# Patient Record
Sex: MALE | Race: BLACK OR AFRICAN AMERICAN | Employment: FULL TIME
[De-identification: names, ages, dates, MRNs, and addresses within clinical notes are randomized per-mention and may not be internally consistent; named-entity substitution may affect disease eponyms.]

---

## 2022-06-07 NOTE — PROGRESS NOTES
Brandon Avelar M.D. Family Medicine and Sports Medicine  [address]  Office : [phone]  Fax : [phone]    Chief Complaint   Patient presents with   Zaraernesto Remberto Establish Care     bluured vision / tingling in the feet x 8 months        History of Present Illness:  Analy Bocanegra is a 45 y.o. male. HPI  49-year-old male presents to establish care with a new provider. Patient endorses some blurred vision as well as tingling in his feet for the past 8 months. Gradual worsening blurry vision over the past several months. Tingling in BL feet over 9 months. Gradual worsening. Started in the right, now in both. No back pain. Denies weakness in legs. Denies incontinence. Worse after a big meal and after first getting out of bed. Paresthesias (numbness/tingling) felt in BL feet. Also endorses polyuria  Denies any known PMH otherwise. Never seen a PCP. Never checked BP or glucose. Denies any CP, SOB, Palpitations. States that he otherwise feels well. Past Medical History:  No past medical history on file. Past Surgical History:  No past surgical history on file. Allergies:   No Known Allergies  Medications:   Prior to Admission medications    Medication Sig Start Date End Date Taking? Authorizing Provider   hydroCHLOROthiazide (HYDRODIURIL) 25 MG tablet Take 1 tablet by mouth every morning 6/8/22  Yes Caio Trimble MD   gabapentin (NEURONTIN) 300 MG capsule Take 1 capsule by mouth 3 times daily as needed (numbness/tingling) for up to 30 days. 6/8/22 7/8/22 Yes Caio Trimble MD      Social History:  Social History     Tobacco Use    Smoking status: Never Smoker    Smokeless tobacco: Never Used   Substance Use Topics    Alcohol use: Not Currently     Family History  Family History   Problem Relation Age of Onset    Cancer Mother     Diabetes Mother        ROS:  All other ROS were negative.  See [facility] for pertinent ROS      Vital Signs  BP (!) 171/107 Comment: Given Hydralazine 10 mg @ 2:15pm  Pulse (!) 108   Temp 98.5 °F (36.9 °C) (Temporal)   Wt 235 lb 3.2 oz (106.7 kg)   SpO2 98%   There is no height or weight on file to calculate BMI. Physical Exam  Vitals and nursing note reviewed. Constitutional:       General: He is not in acute distress. Appearance: He is normal weight. He is not ill-appearing. HENT:      Head: Normocephalic and atraumatic. Eyes:      Extraocular Movements: Extraocular movements intact. Conjunctiva/sclera: Conjunctivae normal.   Cardiovascular:      Rate and Rhythm: Regular rhythm. Tachycardia present. Pulses: Normal pulses. Heart sounds: Normal heart sounds. Pulmonary:      Effort: Pulmonary effort is normal.      Breath sounds: Normal breath sounds. Musculoskeletal:         General: No deformity. Skin:     General: Skin is warm. Neurological:      General: No focal deficit present. Mental Status: He is alert and oriented to person, place, and time. Mental status is at baseline. Psychiatric:         Mood and Affect: Mood normal.         Behavior: Behavior normal.         PMH, PSH, SHx, FHx, Allergies, Medications reviewed and updated as indicated    Assessment/Plan:  Nelida Fagan was seen today for establish care. Diagnoses and all orders for this visit:    Establishing care with new doctor, encounter for  Patient presents to establish care with a new doctor. The  PMH, Fhx, Shx, Allergies, Medications were reviewed from any prior documentation available as well as discussed with the patient. This history has been documented into the chart. Recommended lifestyle changes including proper diet and exercise were discussed. Recommended screenings and baseline labs were reviewed with patient based off age and gender. All questions were answered at this time.      Preventative health care  -     Hepatitis C RNA QNT W Genotype RFLX  -     CBC with Auto Differential  -     Comprehensive Metabolic Panel  -     PSA, Total and Free  -     TSH with Reflex  -     Vitamin D 25 Hydroxy  -     Lipid Panel  -     HIV 1/2 Ag/Ab, 4TH Generation,W Rflx Confirm  -     Hemoglobin A1C    Essential hypertension  Significantly elevated blood pressure in the office. Likely chronic. Denies any red flags. Hydralazine 10 mg provided in the office. Improved blood pressure slightly on recheck. Precautions given. Indications to go to the emergency department discussed. Start on hydrochlorothiazide 25 mg. Follow-up in 1 week. Precautions given. -     hydroCHLOROthiazide (HYDRODIURIL) 25 MG tablet; Take 1 tablet by mouth every morning    Paresthesias  8 to 9 months of bilateral paresthesias. I suspect this is related to undiagnosed diabetes. Will check baseline labs. Consider nerve conduction study. Gabapentin 300 mg prescribed. Precautions given. -     Vitamin B12  -     Folate  -     Ferritin  -     Iron  -     Transferrin  -     Transferrin Saturation  -     gabapentin (NEURONTIN) 300 MG capsule; Take 1 capsule by mouth 3 times daily as needed (numbness/tingling) for up to 30 days. Blurred vision, bilateral  Patient reports several months of blurred vision bilaterally. I suspect this is likely related to his hypertension and possibly some uncontrolled diabetes. Checking labs listed above. Blood pressure medication prescribed. Close follow-up. Referral to ophthalmology  -     Amb External Referral To Optometry    Colon cancer screening  -     Ambulatory referral to Colonoscopy          45 minutes was spent on the day of this encounter including face to face time with patient, chart review, reviewing pt hx, ordering meds/tests/procedures, documenting, and/or interpreting results.     __  Crow Aceves M.D.

## 2022-06-08 ENCOUNTER — OFFICE VISIT (OUTPATIENT)
Dept: INTERNAL MEDICINE CLINIC | Facility: CLINIC | Age: 52
End: 2022-06-08
Payer: COMMERCIAL

## 2022-06-08 VITALS
HEART RATE: 108 BPM | SYSTOLIC BLOOD PRESSURE: 171 MMHG | DIASTOLIC BLOOD PRESSURE: 107 MMHG | OXYGEN SATURATION: 98 % | TEMPERATURE: 98.5 F | WEIGHT: 235.2 LBS

## 2022-06-08 DIAGNOSIS — H53.8 BLURRED VISION, BILATERAL: ICD-10-CM

## 2022-06-08 DIAGNOSIS — I10 ESSENTIAL HYPERTENSION: ICD-10-CM

## 2022-06-08 DIAGNOSIS — R20.2 PARESTHESIAS: ICD-10-CM

## 2022-06-08 DIAGNOSIS — Z00.00 PREVENTATIVE HEALTH CARE: ICD-10-CM

## 2022-06-08 DIAGNOSIS — Z76.89 ESTABLISHING CARE WITH NEW DOCTOR, ENCOUNTER FOR: Primary | ICD-10-CM

## 2022-06-08 DIAGNOSIS — Z12.11 COLON CANCER SCREENING: ICD-10-CM

## 2022-06-08 PROCEDURE — 99204 OFFICE O/P NEW MOD 45 MIN: CPT | Performed by: FAMILY MEDICINE

## 2022-06-08 RX ORDER — HYDROCHLOROTHIAZIDE 25 MG/1
25 TABLET ORAL EVERY MORNING
Qty: 30 TABLET | Refills: 0 | Status: SHIPPED | OUTPATIENT
Start: 2022-06-08 | End: 2022-06-16 | Stop reason: SDUPTHER

## 2022-06-08 RX ORDER — GABAPENTIN 300 MG/1
300 CAPSULE ORAL 3 TIMES DAILY PRN
Qty: 90 CAPSULE | Refills: 0 | Status: SHIPPED | OUTPATIENT
Start: 2022-06-08 | End: 2022-06-16 | Stop reason: SDUPTHER

## 2022-06-08 ASSESSMENT — PATIENT HEALTH QUESTIONNAIRE - PHQ9
SUM OF ALL RESPONSES TO PHQ QUESTIONS 1-9: 0
2. FEELING DOWN, DEPRESSED OR HOPELESS: 0
SUM OF ALL RESPONSES TO PHQ QUESTIONS 1-9: 0
SUM OF ALL RESPONSES TO PHQ9 QUESTIONS 1 & 2: 0
SUM OF ALL RESPONSES TO PHQ QUESTIONS 1-9: 0
1. LITTLE INTEREST OR PLEASURE IN DOING THINGS: 0
SUM OF ALL RESPONSES TO PHQ QUESTIONS 1-9: 0

## 2022-06-08 ASSESSMENT — ANXIETY QUESTIONNAIRES
7. FEELING AFRAID AS IF SOMETHING AWFUL MIGHT HAPPEN: 0
4. TROUBLE RELAXING: 0
GAD7 TOTAL SCORE: 0
2. NOT BEING ABLE TO STOP OR CONTROL WORRYING: 0
5. BEING SO RESTLESS THAT IT IS HARD TO SIT STILL: 0
1. FEELING NERVOUS, ANXIOUS, OR ON EDGE: 0
6. BECOMING EASILY ANNOYED OR IRRITABLE: 0
3. WORRYING TOO MUCH ABOUT DIFFERENT THINGS: 0

## 2022-06-08 NOTE — PATIENT INSTRUCTIONS
Patient Education        Learning About Carbohydrate (Carb) Counting and Eating Out When You Have Diabetes  Why plan your meals? Meal planning can be a key part of managing diabetes. Planning meals and snacks with the right balance of carbohydrate, protein, and fat can help you keep yourblood sugar at the target level you set with your doctor. You don't have to eat special foods. You can eat what your family eats, including sweets once in a while. But you do have to pay attention to how oftenyou eat and how much you eat of certain foods. You may want to work with a dietitian or a diabetes educator. They can give you tips and meal ideas and can answer your questions about meal planning. This health professional can also help you reach a healthy weight if that is one ofyour goals. What should you know about eating carbs? Managing the amount of carbohydrate (carbs) you eat is an important part ofhealthy meals when you have diabetes. Carbohydrate is found in many foods.  Learn which foods have carbs. And learn the amounts of carbs in different foods. ? Bread, cereal, pasta, and rice have about 15 grams of carbs in a serving. A serving is 1 slice of bread (1 ounce), ½ cup of cooked cereal, or 1/3 cup of cooked pasta or rice. ? Fruits have 15 grams of carbs in a serving. A serving is 1 small fresh fruit, such as an apple or orange; ½ of a banana; ½ cup of cooked or canned fruit; ½ cup of fruit juice; 1 cup of melon or raspberries; or 2 tablespoons of dried fruit. ? Milk and no-sugar-added yogurt have 15 grams of carbs in a serving. A serving is 1 cup of milk or 3/4 cup (6 oz) of no-sugar-added yogurt. ? Starchy vegetables have 15 grams of carbs in a serving. A serving is ½ cup of mashed potatoes or sweet potato; 1 cup winter squash; ½ of a small baked potato; ½ cup of cooked beans; or ½ cup cooked corn or green peas.    Learn how much carbs to eat each day and at each meal. A dietitian or certified diabetes educator can teach you how to keep track of the amount of carbs you eat. This is called carbohydrate counting.  If you are not sure how to count carbohydrate grams, use the plate method to plan meals. It is a quick way to make sure that you have a balanced meal. It also can help you manage the amount of carbohydrate you eat at meals. ? Divide your plate by types of foods. Put non-starchy vegetables on half the plate, meat or other protein food on one-quarter of the plate, and a grain or starchy vegetable in the final quarter of the plate. To this you can add a small piece of fruit and 1 cup of milk or yogurt, depending on how many carbs you are supposed to eat at a meal.   Try to eat about the same amount of carbs at each meal. Do not \"save up\" your daily allowance of carbs to eat at one meal.   Proteins have very little or no carbs. Examples of proteins are beef, chicken, turkey, fish, eggs, tofu, cheese, cottage cheese, and peanut butter. How can you eat out and still eat healthy?  Learn to estimate the serving sizes of foods that have carbohydrate. If you measure food at home, it will be easier to estimate the amount in a serving of restaurant food.  If the meal you order has too much carbohydrate (such as potatoes, corn, or baked beans), ask to have a low-carbohydrate food instead. Ask for a salad or non-starchy vegetables like broccoli, cauliflower, green beans, or peppers.  If you eat more carbohydrate at a meal than you had planned, take a walk or do other exercise. This will help lower your blood sugar. What are some tips for eating healthy?  Limit saturated fat, such as the fat from meat and dairy products. This is a healthy choice because people who have diabetes are at higher risk of heart disease. So choose lean cuts of meat and nonfat or low-fat dairy products. Use olive or canola oil instead of butter or shortening when cooking.  Don't skip meals.  Your blood sugar may drop too low if you skip meals and take insulin or certain medicines for diabetes.  Check with your doctor before you drink alcohol. Alcohol can cause your blood sugar to drop too low. Alcohol can also cause a bad reaction if you take certain diabetes medicines. Follow-up care is a key part of your treatment and safety. Be sure to make and go to all appointments, and call your doctor if you are having problems. It's also a good idea to know your test results and keep alist of the medicines you take. Where can you learn more? Go to https://[employer]. org and sign in to your [employer] account. Enter Y478 in the [employer] box to learn more about \"Learning About Carbohydrate (Carb) Counting and Eating Out When You Have Diabetes. \"     If you do not have an account, please click on the \"Sign Up Now\" link. Current as of: September 8, 2021               Content Version: 13.2  © 0515-0870 Healthwise, Incorporated. Care instructions adapted under license by [facility] [address] If you have questions about a medical condition or this instruction, always ask your healthcare professional. [place] [address] any warranty or liability for your use of this information.

## 2022-06-10 ENCOUNTER — TELEPHONE (OUTPATIENT)
Dept: INTERNAL MEDICINE CLINIC | Facility: CLINIC | Age: 52
End: 2022-06-10

## 2022-06-10 DIAGNOSIS — E55.9 VITAMIN D DEFICIENCY: Primary | ICD-10-CM

## 2022-06-10 DIAGNOSIS — E11.65 UNCONTROLLED TYPE 2 DIABETES MELLITUS WITH HYPERGLYCEMIA (HCC): ICD-10-CM

## 2022-06-10 LAB
25(OH)D3 SERPL-MCNC: 14.4 NG/ML (ref 30–100)
ALBUMIN SERPL-MCNC: 3.5 G/DL (ref 3.5–5)
ALBUMIN/GLOB SERPL: 0.9 {RATIO} (ref 1.2–3.5)
ALP SERPL-CCNC: 109 U/L (ref 50–136)
ALT SERPL-CCNC: 17 U/L (ref 12–65)
ANION GAP SERPL CALC-SCNC: 7 MMOL/L (ref 7–16)
AST SERPL-CCNC: 12 U/L (ref 15–37)
BASOPHILS # BLD: 0 K/UL (ref 0–0.2)
BASOPHILS NFR BLD: 1 % (ref 0–2)
BILIRUB SERPL-MCNC: 0.9 MG/DL (ref 0.2–1.1)
BUN SERPL-MCNC: 17 MG/DL (ref 6–23)
CALCIUM SERPL-MCNC: 9 MG/DL (ref 8.3–10.4)
CHLORIDE SERPL-SCNC: 106 MMOL/L (ref 98–107)
CHOLEST SERPL-MCNC: 162 MG/DL
CO2 SERPL-SCNC: 23 MMOL/L (ref 21–32)
CREAT SERPL-MCNC: 1.1 MG/DL (ref 0.8–1.5)
DIFFERENTIAL METHOD BLD: ABNORMAL
EOSINOPHIL # BLD: 0 K/UL (ref 0–0.8)
EOSINOPHIL NFR BLD: 0 % (ref 0.5–7.8)
ERYTHROCYTE [DISTWIDTH] IN BLOOD BY AUTOMATED COUNT: 12.6 % (ref 11.9–14.6)
EST. AVERAGE GLUCOSE BLD GHB EST-MCNC: 295 MG/DL
FERRITIN SERPL-MCNC: 66 NG/ML (ref 8–388)
FOLATE SERPL-MCNC: 15 NG/ML (ref 3.1–17.5)
GLOBULIN SER CALC-MCNC: 4 G/DL (ref 2.3–3.5)
GLUCOSE SERPL-MCNC: 256 MG/DL (ref 65–100)
HBA1C MFR BLD: 11.9 % (ref 4.2–6.3)
HCT VFR BLD AUTO: 40.7 % (ref 41.1–50.3)
HDLC SERPL-MCNC: 54 MG/DL (ref 40–60)
HDLC SERPL: 3 {RATIO}
HGB BLD-MCNC: 13.3 G/DL (ref 13.6–17.2)
HIV 1+2 AB+HIV1 P24 AG SERPL QL IA: NONREACTIVE
HIV 1/2 RESULT COMMENT: NORMAL
IMM GRANULOCYTES # BLD AUTO: 0 K/UL (ref 0–0.5)
IMM GRANULOCYTES NFR BLD AUTO: 0 % (ref 0–5)
IRON SERPL-MCNC: 38 UG/DL (ref 35–150)
LDLC SERPL CALC-MCNC: 92.4 MG/DL
LYMPHOCYTES # BLD: 2.3 K/UL (ref 0.5–4.6)
LYMPHOCYTES NFR BLD: 40 % (ref 13–44)
MCH RBC QN AUTO: 29.4 PG (ref 26.1–32.9)
MCHC RBC AUTO-ENTMCNC: 32.7 G/DL (ref 31.4–35)
MCV RBC AUTO: 90 FL (ref 79.6–97.8)
MONOCYTES # BLD: 0.5 K/UL (ref 0.1–1.3)
MONOCYTES NFR BLD: 9 % (ref 4–12)
NEUTS SEG # BLD: 3 K/UL (ref 1.7–8.2)
NEUTS SEG NFR BLD: 50 % (ref 43–78)
NRBC # BLD: 0 K/UL (ref 0–0.2)
PLATELET # BLD AUTO: 352 K/UL (ref 150–450)
PMV BLD AUTO: 11.3 FL (ref 9.4–12.3)
POTASSIUM SERPL-SCNC: 4.1 MMOL/L (ref 3.5–5.1)
PROT SERPL-MCNC: 7.5 G/DL (ref 6.3–8.2)
PSA FREE MFR SERPL: 11.5 %
PSA FREE SERPL-MCNC: 0.3 NG/ML
PSA SERPL-MCNC: 2.6 NG/ML
RBC # BLD AUTO: 4.52 M/UL (ref 4.23–5.6)
SODIUM SERPL-SCNC: 136 MMOL/L (ref 136–145)
TRANSFERRIN SERPL-MCNC: 240 MG/DL (ref 202–364)
TRIGL SERPL-MCNC: 78 MG/DL (ref 35–150)
TSH W FREE THYROID IF ABNORMAL: 2.9 UIU/ML (ref 0.36–3.74)
VIT B12 SERPL-MCNC: 746 PG/ML (ref 193–986)
VLDLC SERPL CALC-MCNC: 15.6 MG/DL (ref 6–23)
WBC # BLD AUTO: 5.9 K/UL (ref 4.3–11.1)

## 2022-06-10 RX ORDER — ERGOCALCIFEROL 1.25 MG/1
50000 CAPSULE ORAL WEEKLY
Qty: 12 CAPSULE | Refills: 0 | Status: SHIPPED | OUTPATIENT
Start: 2022-06-10 | End: 2022-09-30 | Stop reason: SDUPTHER

## 2022-06-10 NOTE — TELEPHONE ENCOUNTER
Spoke to patient on the phone regarding lab results. We will send in vitamin D supplementation for low vitamin D. We will need to recheck CBC in the near future regarding his slightly low hemoglobin. He has already been referred for colonoscopy. He has significantly elevated A1c at 11.9 likely contributing to his symptoms of blurred vision and paresthesias. We will send for diabetic education. Counseled on diet and carbohydrate control. Will start on metformin 500 twice daily, with quick increase to thousand twice daily once tolerating. Will likely need multiple other medications.   We will follow-up in a week for recheck

## 2022-06-11 LAB
HCV GENTYP SERPL NAA+PROBE: NORMAL
HCV RNA SERPL NAA+PROBE-ACNC: NORMAL IU/ML
HCV RNA SERPL NAA+PROBE-LOG IU: NORMAL LOG10 IU/ML
LABORATORY COMMENT REPORT: NORMAL

## 2022-06-14 PROBLEM — I10 ESSENTIAL HYPERTENSION: Status: ACTIVE | Noted: 2022-06-14

## 2022-06-14 NOTE — PROGRESS NOTES
Vito Stephenson M.D. Family and Sports Medicine  [address] , [address] [place] [address]  Office : [phone]  Fax : [phone]    Chief Complaint   Patient presents with    Follow-up     blood pressure check        History of Present Illness:  Onel Celeste is a 45 y.o. male. HPI  12-year-old male presents for follow-up of his uncontrolled type 2 diabetes with associated paresthesias and blurred vision, hypertension, lab review    Lab Review:  Spoke to patient on the phone regarding lab results. We will send in vitamin D supplementation for low vitamin D. We will need to recheck CBC in the near future regarding his slightly low hemoglobin. He has already been referred for colonoscopy. He has significantly elevated A1c at 11.9 likely contributing to his symptoms of blurred vision and paresthesias. We will send for diabetic education. Counseled on diet and carbohydrate control. Will start on metformin 500 twice daily, with quick increase to thousand twice daily once tolerating. Will likely need multiple other medications. We will follow-up in a week for recheck    Essential hypertension  Significantly elevated blood pressure in the office. Likely chronic. Denies any red flags. Hydralazine 10 mg provided in the office. Improved blood pressure slightly on recheck. Precautions given. Indications to go to the emergency department discussed. Start on hydrochlorothiazide 25 mg. Has been checking BP at home. 1-2 checks at home were around 134/100. Otherwise tolerating medication well    Uncontrolled type 2 diabetes:  Patient presented [facility] on [facility]2022 with complaints of 9 months of paresthesias as well as decreased vision. Lab review revealed A1c of 11.9. He was started on metformin 500 twice daily with instructions to increase up to thousand twice daily. He was given gabapentin 300 mg for paresthesias.   He was referred to diabetic education. Has been tolerating metformin 500 twice daily. Only endorses 1 episode of some lightheadedness when he did not eat breakfast.  Otherwise doing well. Denies GI side effects. Has had some improvement in his paresthesia with the gabapentin. Still present however. Past Medical History:  No past medical history on file. Medications:   Prior to Admission medications    Medication Sig Start Date End Date Taking? Authorizing Provider   amLODIPine (NORVASC) 5 MG tablet Take 1 tablet by mouth daily 6/16/22  Yes Radha Benjamin MD   metFORMIN (GLUCOPHAGE) 1000 MG tablet Take 1 tablet by mouth 2 times daily (with meals) 6/16/22  Yes Radha Benjamin MD   glipiZIDE (GLUCOTROL) 5 MG tablet Take 1 tablet by mouth 2 times daily (before meals) 6/16/22  Yes Radha Benjamin MD   hydroCHLOROthiazide (HYDRODIURIL) 25 MG tablet Take 1 tablet by mouth every morning 6/16/22  Yes Radha Benjamin MD   gabapentin (NEURONTIN) 300 MG capsule Take 1 capsule by mouth 3 times daily as needed (numbness/tingling) for up to 30 days. 6/16/22 7/16/22 Yes Radha Benjamin MD   vitamin D (ERGOCALCIFEROL) 1.25 MG (03644 UT) CAPS capsule Take 1 capsule by mouth once a week 6/10/22  Yes Radha Benjamin MD        ROS:  All other pertinent ROS are negative. See HPI for pertinent ROS    Vital Signs  BP (!) 138/95   Pulse (!) 102   Temp 98.3 °F (36.8 °C) (Temporal)   Wt 234 lb (106.1 kg)   SpO2 98%   There is no height or weight on file to calculate BMI. Physical Exam  Vitals and nursing note reviewed. Constitutional:       General: He is not in acute distress. Appearance: He is normal weight. He is not ill-appearing. HENT:      Head: Normocephalic and atraumatic. Eyes:      Extraocular Movements: Extraocular movements intact. Conjunctiva/sclera: Conjunctivae normal.   Musculoskeletal:         General: No deformity. Skin:     General: Skin is warm. Neurological:      General: No focal deficit present. Mental Status: He is alert and oriented to person, place, and time. Mental status is at baseline. Psychiatric:         Mood and Affect: Mood normal.         Behavior: Behavior normal.         PMH, PSH, SHx, FHx, Allergies, Medications reviewed and updated as indicated    Assessment/Plan:  Kia Ferrell was seen today for follow-up. Diagnoses and all orders for this visit:    Uncontrolled type 2 diabetes mellitus with hyperglycemia ([facility] [address]  Newly diagnosed. Patient has made efforts to significantly improve his diet. Referred to diabetic education, patient plans to call back. Increase metformin from 500,000 twice daily. Start on glipizide 5 mg twice daily. Will likely need additional medication however do not wish to confuse or overwhelm patient at this time as he is making significant efforts with lifestyle. Precautions given. Education on dietary changes provided. Follow-up for recheck in 3 months  -     Amb External Referral To Optometry  -     metFORMIN (GLUCOPHAGE) 1000 MG tablet; Take 1 tablet by mouth 2 times daily (with meals)  -     glipiZIDE (GLUCOTROL) 5 MG tablet; Take 1 tablet by mouth 2 times daily (before meals)    Essential hypertension  Significant improvement since starting hydrochlorothiazide 25 mg. Although still slightly elevated. We will add amlodipine 5 mg. We will consider further increase.  -     amLODIPine (NORVASC) 5 MG tablet; Take 1 tablet by mouth daily  -     hydroCHLOROthiazide (HYDRODIURIL) 25 MG tablet; Take 1 tablet by mouth every morning    Vitamin D deficiency  Continue vitamin D supplementation 50,000 units q. weekly. We will recheck a follow-up  Paresthesias   Improved although still present. Continue gabapentin 300 3 times daily. Continue work on glycemic control.  -     gabapentin (NEURONTIN) 300 MG capsule; Take 1 capsule by mouth 3 times daily as needed (numbness/tingling) for up to 30 days.          28 minutes was spent on the day of this encounter including face to face time with patient, chart review, reviewing pt hx, ordering meds/tests/procedures, documenting, and/or interpreting results.      __  Wendy Cornelius M.D.

## 2022-06-15 ENCOUNTER — FOLLOWUP TELEPHONE ENCOUNTER (OUTPATIENT)
Dept: DIABETES SERVICES | Age: 52
End: 2022-06-15

## 2022-06-15 NOTE — TELEPHONE ENCOUNTER
Referral received for diabetes education. Left message asking for a return call. Pt has BSBS and estimated cost to pt is 61 dollars for assessment and 221 dollars for each class.

## 2022-06-16 ENCOUNTER — OFFICE VISIT (OUTPATIENT)
Dept: INTERNAL MEDICINE CLINIC | Facility: CLINIC | Age: 52
End: 2022-06-16
Payer: COMMERCIAL

## 2022-06-16 ENCOUNTER — FOLLOWUP TELEPHONE ENCOUNTER (OUTPATIENT)
Dept: DIABETES SERVICES | Age: 52
End: 2022-06-16

## 2022-06-16 VITALS
OXYGEN SATURATION: 98 % | DIASTOLIC BLOOD PRESSURE: 95 MMHG | WEIGHT: 234 LBS | TEMPERATURE: 98.3 F | SYSTOLIC BLOOD PRESSURE: 138 MMHG | HEART RATE: 102 BPM

## 2022-06-16 DIAGNOSIS — I10 ESSENTIAL HYPERTENSION: ICD-10-CM

## 2022-06-16 DIAGNOSIS — R20.2 PARESTHESIAS: ICD-10-CM

## 2022-06-16 DIAGNOSIS — E11.65 UNCONTROLLED TYPE 2 DIABETES MELLITUS WITH HYPERGLYCEMIA (HCC): Primary | ICD-10-CM

## 2022-06-16 DIAGNOSIS — E55.9 VITAMIN D DEFICIENCY: ICD-10-CM

## 2022-06-16 PROCEDURE — 3046F HEMOGLOBIN A1C LEVEL >9.0%: CPT | Performed by: FAMILY MEDICINE

## 2022-06-16 PROCEDURE — 99214 OFFICE O/P EST MOD 30 MIN: CPT | Performed by: FAMILY MEDICINE

## 2022-06-16 RX ORDER — GLIPIZIDE 5 MG/1
5 TABLET ORAL
Qty: 60 TABLET | Refills: 5 | Status: SHIPPED | OUTPATIENT
Start: 2022-06-16

## 2022-06-16 RX ORDER — GABAPENTIN 300 MG/1
300 CAPSULE ORAL 3 TIMES DAILY PRN
Qty: 90 CAPSULE | Refills: 5 | Status: SHIPPED | OUTPATIENT
Start: 2022-06-16 | End: 2022-09-29

## 2022-06-16 RX ORDER — AMLODIPINE BESYLATE 5 MG/1
5 TABLET ORAL DAILY
Qty: 30 TABLET | Refills: 3 | Status: SHIPPED | OUTPATIENT
Start: 2022-06-16

## 2022-06-16 RX ORDER — HYDROCHLOROTHIAZIDE 25 MG/1
25 TABLET ORAL EVERY MORNING
Qty: 30 TABLET | Refills: 5 | Status: SHIPPED | OUTPATIENT
Start: 2022-06-16

## 2022-06-16 ASSESSMENT — ANXIETY QUESTIONNAIRES
5. BEING SO RESTLESS THAT IT IS HARD TO SIT STILL: 0
GAD7 TOTAL SCORE: 0
2. NOT BEING ABLE TO STOP OR CONTROL WORRYING: 0
1. FEELING NERVOUS, ANXIOUS, OR ON EDGE: 0
3. WORRYING TOO MUCH ABOUT DIFFERENT THINGS: 0
4. TROUBLE RELAXING: 0
6. BECOMING EASILY ANNOYED OR IRRITABLE: 0
7. FEELING AFRAID AS IF SOMETHING AWFUL MIGHT HAPPEN: 0

## 2022-06-16 ASSESSMENT — PATIENT HEALTH QUESTIONNAIRE - PHQ9
SUM OF ALL RESPONSES TO PHQ9 QUESTIONS 1 & 2: 0
2. FEELING DOWN, DEPRESSED OR HOPELESS: 0
SUM OF ALL RESPONSES TO PHQ QUESTIONS 1-9: 0
SUM OF ALL RESPONSES TO PHQ QUESTIONS 1-9: 0
1. LITTLE INTEREST OR PLEASURE IN DOING THINGS: 0
SUM OF ALL RESPONSES TO PHQ QUESTIONS 1-9: 0
SUM OF ALL RESPONSES TO PHQ QUESTIONS 1-9: 0

## 2022-06-16 NOTE — TELEPHONE ENCOUNTER
Pt called us back and left message. Talked with pt today and gave him the estimated cost of 61 for assessment and 221 for each class. Gave pt G codes for him to check with his insurance company. Also gave pt the option of seeing RD and/or RN for 1-2 hrs and not do group classes. Pt to call us back. If we don't hear from pt by 7/5/22 we will call pt back.

## 2022-07-01 ENCOUNTER — TELEPHONE (OUTPATIENT)
Dept: INTERNAL MEDICINE CLINIC | Facility: CLINIC | Age: 52
End: 2022-07-01

## 2022-07-01 DIAGNOSIS — E11.65 UNCONTROLLED TYPE 2 DIABETES MELLITUS WITH HYPERGLYCEMIA (HCC): Primary | ICD-10-CM

## 2022-07-01 RX ORDER — METFORMIN HYDROCHLORIDE 500 MG/1
1000 TABLET, EXTENDED RELEASE ORAL
Qty: 60 TABLET | Refills: 5 | Status: SHIPPED | OUTPATIENT
Start: 2022-07-01

## 2022-07-01 NOTE — TELEPHONE ENCOUNTER
Pt called and states that he is having Diarrhea with the Metformin really bad . And a upset stomach. Has some dizziness as well . I informed pt that he needs to eat before taking the Glipizide . He states that he was taking it before he ate.  He was checking his sugar and it was around 99

## 2022-07-01 NOTE — TELEPHONE ENCOUNTER
Spoke to patient on the phone. Having a lot of loose stools and GI upset with the metformin. He has only been taking it for the past week. We will switch to metformin XR. Precautions advised. Importance of staying hydrated discussed. Discussed appropriate dosing and precautions with glipizide. We will try the new metformin and see if he can tolerate.   If not, we will consider alternative options

## 2022-07-12 ENCOUNTER — TELEPHONE (OUTPATIENT)
Dept: DIABETES SERVICES | Age: 52
End: 2022-07-12

## 2022-07-12 NOTE — TELEPHONE ENCOUNTER
Called patient back about Diabetes Education. Called and left a message asking if he is still interested, and/or results of insurance verification with the G codes that were given. Left call back number. Second call.

## 2022-08-29 ENCOUNTER — TELEPHONE (OUTPATIENT)
Dept: INTERNAL MEDICINE CLINIC | Facility: CLINIC | Age: 52
End: 2022-08-29

## 2022-08-29 NOTE — TELEPHONE ENCOUNTER
----- Message from Mary Will sent at 8/26/2022 11:46 AM EDT -----  Subject: Message to Provider    QUESTIONS  pt has tested positive for covid and was advised by his pharmacy to take a couple of prescriptions.  [facility] in [facility] faxed   over the medications and refill request to the office     [address] [facility]  [phone]; OK to leave message on voicemail

## 2022-08-29 NOTE — TELEPHONE ENCOUNTER
I called and LMOM to have the pt call back to obtain more info from him. 1. When he tested +? 2. Is he symptomatic? And 3. If the pharm recommended Paxlovid?

## 2022-08-30 NOTE — TELEPHONE ENCOUNTER
I called the pt again this morning. He stated he tested + on Friday. He notes he thinks he is about over it at this point. The pharmacy did recommend Paxlovid, but wanted Dr. Kayley Short to review his medication to see if he is able to take this. I notified the pt that if he is no longer having sxs the Paxlovid would not be helpful. I suggested he continue to remain well hydrated, take Vit C, Vit D3, and Zinc, and tx his sxs with OTC meds. The pt verbalized understanding and was agreeable.

## 2022-09-17 DIAGNOSIS — I10 ESSENTIAL HYPERTENSION: ICD-10-CM

## 2022-09-19 RX ORDER — AMLODIPINE BESYLATE 5 MG/1
TABLET ORAL
Qty: 30 TABLET | Refills: 3 | OUTPATIENT
Start: 2022-09-19

## 2022-09-27 NOTE — PROGRESS NOTES
Everette Ch M.D. [facility]  [address], [address]   Office : [phone]  Fax : [phone]    Chief Complaint   Patient presents with    Diabetes     The pt is in for a 3 mth f/u on DM and Htn. The pt notes he had COVID about a month and a half ago. He notes he cannot seem to get rid of the congestion in his chest.  The pt notes he also has some patchy, scaly skin on his R arm and on his face. Pt states he has completed the Vit D 12204MB and that the Gabapentin did not seem to really help. History of Present Illness:  Sejal Reyna is a 45 y.o. male. HPI  73-year-old male presents for follow-up in regards to his hypertension, diabetes      Uncontrolled type 2 diabetes mellitus with hyperglycemia ([address]  Newly diagnosed recently with A1C of 11.9. Patient has made efforts to significantly improve his diet. Referred to diabetic education, patient plans to call back. Increased metformin from 500 to 1000 twice daily. Started on glipizide 5 mg twice daily. Will likely need additional medication however did not wish to confuse or overwhelm patient at this time as he is making significant efforts with lifestyle. Education on dietary changes provided. States he was working well on his diet however he admits to gaining weight recently after trying to recover from [place] roughly a month ago. Weight has remained steady at 235 compared to 3 months prior    Vitamin D deficiency  placed vitamin D supplementation 50,000 units q. weekly. Completed therapy. Not currently taking any additional supplementation    Paresthesias   Improved although still present. Continue gabapentin 300 3 times daily. Continue work on glycemic control. Denies any adverse effects from the gabapentin but denies any significant relief    Essential hypertension  Significant improvement since starting hydrochlorothiazide 25 mg.   Although still slightly elevated. We added amlodipine 5 mg. We will consider further increase. States he has been doing well with the current medication however he has not yet taken his blood pressure medication as he recently just got off of his work shift    Past Medical History:  History reviewed. No pertinent past medical history. Medications:   Prior to Admission medications    Medication Sig Start Date End Date Taking? Authorizing Provider   gabapentin (NEURONTIN) 800 MG tablet Take 1 tablet by mouth 3 times daily as needed (paresthesia) for up to 90 days. 9/29/22 12/28/22 Yes Soo Barnhart MD   azithromycin (ZITHROMAX) 250 MG tablet Take 1 tablet by mouth See Admin Instructions for 5 days 500mg on day 1 followed by 250mg on days 2 - 5 9/29/22 10/4/22 Yes Soo Barnhart MD   albuterol sulfate HFA (PROVENTIL HFA) 108 (90 Base) MCG/ACT inhaler Inhale 2 puffs into the lungs every 6 hours as needed for Wheezing 9/29/22  Yes Soo Barnhart MD   Semaglutide 7 MG TABS Take 1 tablet by mouth every morning 10/29/22  Yes Soo Barnhart MD   metFORMIN (GLUCOPHAGE-XR) 500 MG extended release tablet Take 2 tablets by mouth daily (with breakfast) 7/1/22  Yes Soo Barnhart MD   amLODIPine (NORVASC) 5 MG tablet Take 1 tablet by mouth daily 6/16/22  Yes Soo Barnhart MD   glipiZIDE (GLUCOTROL) 5 MG tablet Take 1 tablet by mouth 2 times daily (before meals) 6/16/22  Yes Soo Barnhart MD   hydroCHLOROthiazide (HYDRODIURIL) 25 MG tablet Take 1 tablet by mouth every morning 6/16/22  Yes oSo Barnhart MD   vitamin D (ERGOCALCIFEROL) 1.25 MG (33183 UT) CAPS capsule Take 1 capsule by mouth once a week  Patient not taking: Reported on 9/29/2022 6/10/22   Soo Barnhart MD        ROS:  All other pertinent ROS are negative.  See HPI for pertinent ROS    Vital Signs  BP (!) 147/95 (Site: Right Upper Arm, Position: Sitting, Cuff Size: Large Adult)   Pulse (!) 105   Temp 98.7 °F (37.1 °C) (Temporal)   Ht 5' 10.7\" (1.796 m)   Wt 235 lb 12.8 oz (107 kg)   SpO2 97%   BMI 33.17 kg/m²   Body mass index is 33.17 kg/m². Physical Exam  Vitals and nursing note reviewed. Constitutional:       General: He is not in acute distress. Appearance: He is normal weight. He is not ill-appearing. HENT:      Head: Normocephalic and atraumatic. Eyes:      Extraocular Movements: Extraocular movements intact. Conjunctiva/sclera: Conjunctivae normal.   Cardiovascular:      Rate and Rhythm: Normal rate and regular rhythm. Pulses: Normal pulses. Heart sounds: Normal heart sounds. Pulmonary:      Effort: Pulmonary effort is normal.      Breath sounds: Normal breath sounds. Musculoskeletal:         General: No deformity. Comments: Visual inspection:  Deformity/amputation: absent  Skin lesions/pre-ulcerative calluses: present - Calluses BL 1st and 5th toes  Edema: right- negative, left- negative    Sensory exam:  Monofilament sensation: abnormal -sensation along bilateral fifth toe decreased, worse on the left  (minimum of 5 random plantar locations tested, avoiding callused areas - > 1 area with absence of sensation is + for neuropathy)    Pulses: normal,      Skin:     General: Skin is warm. Neurological:      General: No focal deficit present. Mental Status: He is alert and oriented to person, place, and time. Mental status is at baseline. Psychiatric:         Mood and Affect: Mood normal.         Behavior: Behavior normal.       PMH, PSH, SHx, FHx, Allergies, Medications reviewed and updated as indicated    Assessment/Plan:  Gonzalo Casillas was seen today for diabetes. Diagnoses and all orders for this visit:    Uncontrolled type 2 diabetes mellitus with hyperglycemia ([facility] [address]  Recently diagnosed with a A1c 11.9. Had never been to a doctor previously. Currently on metformin 1000 twice daily as well as glipizide 5 twice daily. Tolerating well. Was doing well with diet but admits that he has not been compliant recently. Has been referred to dietitian. Will check eye exam.  Foot exam completed today. Recheck A1c as well as baseline labs. Check microalbumin. Will start on Rybelsus 3 mg sample given in office. Start on 7 mg in a month. Adverse effects discussed. -     Hemoglobin A1C; Future  -     gabapentin (NEURONTIN) 800 MG tablet; Take 1 tablet by mouth 3 times daily as needed (paresthesia) for up to 90 days.  -     [facility]  -     Microalbumin / Creatinine Urine Ratio; Future  -     Semaglutide 7 MG TABS; Take 1 tablet by mouth every morning  -      DIABETES FOOT EXAM    Vitamin D deficiency  -     Vitamin D 25 Hydroxy; Future    Paresthesias  Continued paresthesias. Denies adverse effects or improvement from the gabapentin. Discussed options, patient wishes to try a higher dose. Continue working on glucose control. Consider nerve conduction study at follow-up  -     gabapentin (NEURONTIN) 800 MG tablet; Take 1 tablet by mouth 3 times daily as needed (paresthesia) for up to 90 days. Essential hypertension  Patient states has been controlled since being on hydrochlorothiazide amlodipine. Has not taken blood pressure medications this morning, parameters given  Immunization due  -     Pneumococcal, PCV20, PREVNAR 20, (age 25 yrs+), IM, PF    History of COVID-19  Patient endorses some persistent chest congestion and shortness of breath, mild since having COVID a month ago. We will send in Z-Kota as well as as needed albuterol. Precautions given. Follow-up as needed  -     azithromycin (ZITHROMAX) 250 MG tablet; Take 1 tablet by mouth See Admin Instructions for 5 days 500mg on day 1 followed by 250mg on days 2 - 5  -     albuterol sulfate HFA (PROVENTIL HFA) 108 (90 Base) MCG/ACT inhaler;  Inhale 2 puffs into the lungs every 6 hours as needed for Wheezing         Return in about 3 months (around 12/29/2022) for DM check. 35 minutes was spent on the day of this encounter including face to face time with patient, chart review, reviewing pt hx, ordering meds/tests/procedures, documenting, and/or interpreting results.      __  Tobin Hess M.D.

## 2022-09-29 ENCOUNTER — OFFICE VISIT (OUTPATIENT)
Dept: INTERNAL MEDICINE CLINIC | Facility: CLINIC | Age: 52
End: 2022-09-29
Payer: COMMERCIAL

## 2022-09-29 VITALS
BODY MASS INDEX: 33.01 KG/M2 | SYSTOLIC BLOOD PRESSURE: 147 MMHG | OXYGEN SATURATION: 97 % | DIASTOLIC BLOOD PRESSURE: 95 MMHG | WEIGHT: 235.8 LBS | HEART RATE: 105 BPM | HEIGHT: 71 IN | TEMPERATURE: 98.7 F

## 2022-09-29 DIAGNOSIS — E11.65 UNCONTROLLED TYPE 2 DIABETES MELLITUS WITH HYPERGLYCEMIA (HCC): Primary | ICD-10-CM

## 2022-09-29 DIAGNOSIS — E55.9 VITAMIN D DEFICIENCY: ICD-10-CM

## 2022-09-29 DIAGNOSIS — I10 ESSENTIAL HYPERTENSION: ICD-10-CM

## 2022-09-29 DIAGNOSIS — Z23 IMMUNIZATION DUE: ICD-10-CM

## 2022-09-29 DIAGNOSIS — E11.65 UNCONTROLLED TYPE 2 DIABETES MELLITUS WITH HYPERGLYCEMIA (HCC): ICD-10-CM

## 2022-09-29 DIAGNOSIS — Z86.16 HISTORY OF COVID-19: ICD-10-CM

## 2022-09-29 DIAGNOSIS — R20.2 PARESTHESIAS: ICD-10-CM

## 2022-09-29 LAB
25(OH)D3 SERPL-MCNC: 18.8 NG/ML (ref 30–100)
EST. AVERAGE GLUCOSE BLD GHB EST-MCNC: 246 MG/DL
HBA1C MFR BLD: 10.2 % (ref 4.8–5.6)

## 2022-09-29 PROCEDURE — 90471 IMMUNIZATION ADMIN: CPT | Performed by: FAMILY MEDICINE

## 2022-09-29 PROCEDURE — 90677 PCV20 VACCINE IM: CPT | Performed by: FAMILY MEDICINE

## 2022-09-29 PROCEDURE — 99214 OFFICE O/P EST MOD 30 MIN: CPT | Performed by: FAMILY MEDICINE

## 2022-09-29 PROCEDURE — 3046F HEMOGLOBIN A1C LEVEL >9.0%: CPT | Performed by: FAMILY MEDICINE

## 2022-09-29 RX ORDER — ALBUTEROL SULFATE 90 UG/1
2 AEROSOL, METERED RESPIRATORY (INHALATION) EVERY 6 HOURS PRN
Qty: 18 G | Refills: 3 | Status: SHIPPED | OUTPATIENT
Start: 2022-09-29

## 2022-09-29 RX ORDER — GABAPENTIN 800 MG/1
800 TABLET ORAL 3 TIMES DAILY PRN
Qty: 90 TABLET | Refills: 3 | Status: SHIPPED | OUTPATIENT
Start: 2022-09-29 | End: 2022-12-28

## 2022-09-29 RX ORDER — AZITHROMYCIN 250 MG/1
250 TABLET, FILM COATED ORAL SEE ADMIN INSTRUCTIONS
Qty: 6 TABLET | Refills: 0 | Status: SHIPPED | OUTPATIENT
Start: 2022-09-29 | End: 2022-10-04

## 2022-09-29 ASSESSMENT — PATIENT HEALTH QUESTIONNAIRE - PHQ9
1. LITTLE INTEREST OR PLEASURE IN DOING THINGS: 0
SUM OF ALL RESPONSES TO PHQ9 QUESTIONS 1 & 2: 0
SUM OF ALL RESPONSES TO PHQ QUESTIONS 1-9: 0
2. FEELING DOWN, DEPRESSED OR HOPELESS: 0
SUM OF ALL RESPONSES TO PHQ QUESTIONS 1-9: 0

## 2022-09-29 ASSESSMENT — ANXIETY QUESTIONNAIRES
7. FEELING AFRAID AS IF SOMETHING AWFUL MIGHT HAPPEN: 0
GAD7 TOTAL SCORE: 0
6. BECOMING EASILY ANNOYED OR IRRITABLE: 0
3. WORRYING TOO MUCH ABOUT DIFFERENT THINGS: 0
5. BEING SO RESTLESS THAT IT IS HARD TO SIT STILL: 0
1. FEELING NERVOUS, ANXIOUS, OR ON EDGE: 0
4. TROUBLE RELAXING: 0
2. NOT BEING ABLE TO STOP OR CONTROL WORRYING: 0

## 2022-09-30 DIAGNOSIS — E55.9 VITAMIN D DEFICIENCY: Primary | ICD-10-CM

## 2022-09-30 LAB
CREAT UR-MCNC: 69 MG/DL
MICROALBUMIN UR-MCNC: 18.4 MG/DL
MICROALBUMIN/CREAT UR-RTO: 267 MG/G

## 2022-09-30 RX ORDER — ERGOCALCIFEROL 1.25 MG/1
50000 CAPSULE ORAL WEEKLY
Qty: 12 CAPSULE | Refills: 1 | Status: SHIPPED | OUTPATIENT
Start: 2022-09-30

## 2022-12-29 ENCOUNTER — TELEPHONE (OUTPATIENT)
Dept: INTERNAL MEDICINE CLINIC | Facility: CLINIC | Age: 52
End: 2022-12-29

## 2022-12-29 NOTE — TELEPHONE ENCOUNTER
----- Message from Hugh Mabry sent at 12/29/2022  8:25 AM EST -----  Subject: Appointment Request    Reason for Call: Established Patient Appointment needed: Routine Existing   Condition Follow Up (Diabetes)    QUESTIONS    Reason for appointment request? No appointments available during search     Additional Information for Provider? Patient needs to reschedule appt for   today at 9:00am for 3 month check for diabetes management.  Prefers   afternoon around 1:00pm.  ---------------------------------------------------------------------------  --------------  Chan THORPE  [phone]; OK to leave message on voicemail  ---------------------------------------------------------------------------  --------------  SCRIPT ANSWERS  COVID Screen: Aries Calloway

## 2023-05-17 ENCOUNTER — TELEPHONE (OUTPATIENT)
Dept: DIABETES SERVICES | Age: 53
End: 2023-05-17

## 2023-05-17 NOTE — TELEPHONE ENCOUNTER
First call to let patient know that DME is now free at no cost.  Left message to please call to schedule classes. Phone number left for call back. [phone].

## 2023-09-29 ENCOUNTER — OFFICE VISIT (OUTPATIENT)
Dept: INTERNAL MEDICINE CLINIC | Facility: CLINIC | Age: 53
End: 2023-09-29
Payer: COMMERCIAL

## 2023-09-29 VITALS
DIASTOLIC BLOOD PRESSURE: 106 MMHG | TEMPERATURE: 98.3 F | WEIGHT: 238 LBS | HEART RATE: 90 BPM | OXYGEN SATURATION: 95 % | HEIGHT: 70 IN | SYSTOLIC BLOOD PRESSURE: 162 MMHG | BODY MASS INDEX: 34.07 KG/M2 | RESPIRATION RATE: 14 BRPM

## 2023-09-29 DIAGNOSIS — I10 ESSENTIAL HYPERTENSION: ICD-10-CM

## 2023-09-29 DIAGNOSIS — E55.9 VITAMIN D DEFICIENCY: ICD-10-CM

## 2023-09-29 DIAGNOSIS — E66.9 CLASS 1 OBESITY WITH SERIOUS COMORBIDITY AND BODY MASS INDEX (BMI) OF 34.0 TO 34.9 IN ADULT, UNSPECIFIED OBESITY TYPE: ICD-10-CM

## 2023-09-29 DIAGNOSIS — E11.65 UNCONTROLLED TYPE 2 DIABETES MELLITUS WITH HYPERGLYCEMIA (HCC): Primary | ICD-10-CM

## 2023-09-29 DIAGNOSIS — G62.9 NEUROPATHY: ICD-10-CM

## 2023-09-29 PROBLEM — E66.811 CLASS 1 OBESITY WITH SERIOUS COMORBIDITY AND BODY MASS INDEX (BMI) OF 34.0 TO 34.9 IN ADULT: Status: ACTIVE | Noted: 2023-09-29

## 2023-09-29 LAB
ALBUMIN SERPL-MCNC: 3.3 G/DL (ref 3.5–5)
ALBUMIN/GLOB SERPL: 0.9 (ref 0.4–1.6)
ALP SERPL-CCNC: 98 U/L (ref 50–136)
ALT SERPL-CCNC: 25 U/L (ref 12–65)
ANION GAP SERPL CALC-SCNC: 4 MMOL/L (ref 2–11)
AST SERPL-CCNC: 20 U/L (ref 15–37)
BASOPHILS # BLD: 0.1 K/UL (ref 0–0.2)
BASOPHILS NFR BLD: 1 % (ref 0–2)
BILIRUB SERPL-MCNC: 0.8 MG/DL (ref 0.2–1.1)
BUN SERPL-MCNC: 17 MG/DL (ref 6–23)
CALCIUM SERPL-MCNC: 9.1 MG/DL (ref 8.3–10.4)
CHLORIDE SERPL-SCNC: 108 MMOL/L (ref 101–110)
CHOLEST SERPL-MCNC: 171 MG/DL
CO2 SERPL-SCNC: 28 MMOL/L (ref 21–32)
CREAT SERPL-MCNC: 1.2 MG/DL (ref 0.8–1.5)
CREAT UR-MCNC: 123 MG/DL
DIFFERENTIAL METHOD BLD: NORMAL
EOSINOPHIL # BLD: 0 K/UL (ref 0–0.8)
EOSINOPHIL NFR BLD: 1 % (ref 0.5–7.8)
ERYTHROCYTE [DISTWIDTH] IN BLOOD BY AUTOMATED COUNT: 13 % (ref 11.9–14.6)
EST. AVERAGE GLUCOSE BLD GHB EST-MCNC: 255 MG/DL
GLOBULIN SER CALC-MCNC: 3.6 G/DL (ref 2.8–4.5)
GLUCOSE SERPL-MCNC: 229 MG/DL (ref 65–100)
HBA1C MFR BLD: 10.5 % (ref 4.8–5.6)
HCT VFR BLD AUTO: 41.6 % (ref 41.1–50.3)
HDLC SERPL-MCNC: 52 MG/DL (ref 40–60)
HDLC SERPL: 3.3
HGB BLD-MCNC: 13.6 G/DL (ref 13.6–17.2)
IMM GRANULOCYTES # BLD AUTO: 0 K/UL (ref 0–0.5)
IMM GRANULOCYTES NFR BLD AUTO: 0 % (ref 0–5)
LDLC SERPL CALC-MCNC: 109.2 MG/DL
LYMPHOCYTES # BLD: 2.5 K/UL (ref 0.5–4.6)
LYMPHOCYTES NFR BLD: 39 % (ref 13–44)
MCH RBC QN AUTO: 30 PG (ref 26.1–32.9)
MCHC RBC AUTO-ENTMCNC: 32.7 G/DL (ref 31.4–35)
MCV RBC AUTO: 91.8 FL (ref 82–102)
MICROALBUMIN UR-MCNC: 108 MG/DL
MICROALBUMIN/CREAT UR-RTO: 878 MG/G (ref 0–30)
MONOCYTES # BLD: 0.6 K/UL (ref 0.1–1.3)
MONOCYTES NFR BLD: 10 % (ref 4–12)
NEUTS SEG # BLD: 3.3 K/UL (ref 1.7–8.2)
NEUTS SEG NFR BLD: 49 % (ref 43–78)
NRBC # BLD: 0 K/UL (ref 0–0.2)
PLATELET # BLD AUTO: 316 K/UL (ref 150–450)
PMV BLD AUTO: 11.4 FL (ref 9.4–12.3)
POTASSIUM SERPL-SCNC: 4.5 MMOL/L (ref 3.5–5.1)
PROT SERPL-MCNC: 6.9 G/DL (ref 6.3–8.2)
RBC # BLD AUTO: 4.53 M/UL (ref 4.23–5.6)
SODIUM SERPL-SCNC: 140 MMOL/L (ref 133–143)
TRIGL SERPL-MCNC: 49 MG/DL (ref 35–150)
VLDLC SERPL CALC-MCNC: 9.8 MG/DL (ref 6–23)
WBC # BLD AUTO: 6.5 K/UL (ref 4.3–11.1)

## 2023-09-29 PROCEDURE — 3080F DIAST BP >= 90 MM HG: CPT | Performed by: INTERNAL MEDICINE

## 2023-09-29 PROCEDURE — 99214 OFFICE O/P EST MOD 30 MIN: CPT | Performed by: INTERNAL MEDICINE

## 2023-09-29 PROCEDURE — 3077F SYST BP >= 140 MM HG: CPT | Performed by: INTERNAL MEDICINE

## 2023-09-29 RX ORDER — ROSUVASTATIN CALCIUM 10 MG/1
10 TABLET, COATED ORAL NIGHTLY
Qty: 30 TABLET | Refills: 3 | Status: SHIPPED | OUTPATIENT
Start: 2023-09-29

## 2023-09-29 RX ORDER — ORAL SEMAGLUTIDE 7 MG/1
1 TABLET ORAL DAILY
Qty: 90 TABLET | Refills: 0 | Status: SHIPPED | OUTPATIENT
Start: 2023-09-29 | End: 2023-12-28

## 2023-09-29 RX ORDER — LOSARTAN POTASSIUM AND HYDROCHLOROTHIAZIDE 25; 100 MG/1; MG/1
1 TABLET ORAL DAILY
Qty: 90 TABLET | Refills: 1 | Status: SHIPPED | OUTPATIENT
Start: 2023-09-29

## 2023-09-29 RX ORDER — METFORMIN HYDROCHLORIDE 500 MG/1
1000 TABLET, EXTENDED RELEASE ORAL
Qty: 360 TABLET | Refills: 0 | Status: SHIPPED | OUTPATIENT
Start: 2023-09-29 | End: 2023-12-28

## 2023-09-29 RX ORDER — GLIPIZIDE 10 MG/1
10 TABLET ORAL 2 TIMES DAILY
Qty: 180 TABLET | Refills: 0 | Status: SHIPPED | OUTPATIENT
Start: 2023-09-29 | End: 2023-12-28

## 2023-09-29 RX ORDER — MELATONIN
1000 DAILY
Qty: 90 TABLET | Refills: 1 | Status: SHIPPED | OUTPATIENT
Start: 2023-09-29

## 2023-09-29 SDOH — ECONOMIC STABILITY: FOOD INSECURITY: WITHIN THE PAST 12 MONTHS, THE FOOD YOU BOUGHT JUST DIDN'T LAST AND YOU DIDN'T HAVE MONEY TO GET MORE.: NEVER TRUE

## 2023-09-29 SDOH — ECONOMIC STABILITY: HOUSING INSECURITY
IN THE LAST 12 MONTHS, WAS THERE A TIME WHEN YOU DID NOT HAVE A STEADY PLACE TO SLEEP OR SLEPT IN A SHELTER (INCLUDING NOW)?: NO

## 2023-09-29 SDOH — ECONOMIC STABILITY: FOOD INSECURITY: WITHIN THE PAST 12 MONTHS, YOU WORRIED THAT YOUR FOOD WOULD RUN OUT BEFORE YOU GOT MONEY TO BUY MORE.: NEVER TRUE

## 2023-09-29 SDOH — ECONOMIC STABILITY: INCOME INSECURITY: HOW HARD IS IT FOR YOU TO PAY FOR THE VERY BASICS LIKE FOOD, HOUSING, MEDICAL CARE, AND HEATING?: NOT HARD AT ALL

## 2023-09-29 ASSESSMENT — ANXIETY QUESTIONNAIRES
4. TROUBLE RELAXING: 0
GAD7 TOTAL SCORE: 0
IF YOU CHECKED OFF ANY PROBLEMS ON THIS QUESTIONNAIRE, HOW DIFFICULT HAVE THESE PROBLEMS MADE IT FOR YOU TO DO YOUR WORK, TAKE CARE OF THINGS AT HOME, OR GET ALONG WITH OTHER PEOPLE: NOT DIFFICULT AT ALL
2. NOT BEING ABLE TO STOP OR CONTROL WORRYING: 0
5. BEING SO RESTLESS THAT IT IS HARD TO SIT STILL: 0
3. WORRYING TOO MUCH ABOUT DIFFERENT THINGS: 0
6. BECOMING EASILY ANNOYED OR IRRITABLE: 0
7. FEELING AFRAID AS IF SOMETHING AWFUL MIGHT HAPPEN: 0
1. FEELING NERVOUS, ANXIOUS, OR ON EDGE: 0

## 2023-09-29 ASSESSMENT — PATIENT HEALTH QUESTIONNAIRE - PHQ9
SUM OF ALL RESPONSES TO PHQ QUESTIONS 1-9: 0
2. FEELING DOWN, DEPRESSED OR HOPELESS: 0
SUM OF ALL RESPONSES TO PHQ QUESTIONS 1-9: 0
SUM OF ALL RESPONSES TO PHQ QUESTIONS 1-9: 0
1. LITTLE INTEREST OR PLEASURE IN DOING THINGS: 0
SUM OF ALL RESPONSES TO PHQ9 QUESTIONS 1 & 2: 0
SUM OF ALL RESPONSES TO PHQ QUESTIONS 1-9: 0

## 2023-10-02 DIAGNOSIS — R80.9 MICROALBUMINURIA: ICD-10-CM

## 2023-10-02 DIAGNOSIS — E11.65 UNCONTROLLED TYPE 2 DIABETES MELLITUS WITH HYPERGLYCEMIA (HCC): Primary | ICD-10-CM

## 2024-01-17 DIAGNOSIS — E11.65 UNCONTROLLED TYPE 2 DIABETES MELLITUS WITH HYPERGLYCEMIA (HCC): ICD-10-CM

## 2024-01-17 RX ORDER — METFORMIN HYDROCHLORIDE 500 MG/1
TABLET, EXTENDED RELEASE ORAL
Qty: 120 TABLET | Refills: 2 | Status: SHIPPED | OUTPATIENT
Start: 2024-01-17

## 2024-01-17 NOTE — TELEPHONE ENCOUNTER
As of last office note (12/18/2023), patient is still taking this medication and will be out of medication by their appointment (03/19/2024). Pended medication has correct quantity and pended to preferred pharmacy.

## 2024-02-02 PROBLEM — N18.2 DIABETES MELLITUS WITH STAGE 2 CHRONIC KIDNEY DISEASE (HCC): Status: ACTIVE | Noted: 2023-10-31

## 2024-02-02 PROBLEM — E11.22 DIABETES MELLITUS WITH STAGE 2 CHRONIC KIDNEY DISEASE (HCC): Status: ACTIVE | Noted: 2023-10-31

## 2024-03-20 ENCOUNTER — OFFICE VISIT (OUTPATIENT)
Dept: INTERNAL MEDICINE CLINIC | Facility: CLINIC | Age: 54
End: 2024-03-20
Payer: COMMERCIAL

## 2024-03-20 VITALS
DIASTOLIC BLOOD PRESSURE: 106 MMHG | OXYGEN SATURATION: 97 % | TEMPERATURE: 98.4 F | HEIGHT: 70 IN | SYSTOLIC BLOOD PRESSURE: 183 MMHG | BODY MASS INDEX: 33.07 KG/M2 | HEART RATE: 84 BPM | RESPIRATION RATE: 14 BRPM | WEIGHT: 231 LBS

## 2024-03-20 DIAGNOSIS — Z12.12 ENCOUNTER FOR SCREENING FOR COLORECTAL MALIGNANT NEOPLASM: ICD-10-CM

## 2024-03-20 DIAGNOSIS — E55.9 VITAMIN D DEFICIENCY: ICD-10-CM

## 2024-03-20 DIAGNOSIS — Z12.11 ENCOUNTER FOR SCREENING FOR COLORECTAL MALIGNANT NEOPLASM: ICD-10-CM

## 2024-03-20 DIAGNOSIS — I10 ESSENTIAL HYPERTENSION: ICD-10-CM

## 2024-03-20 DIAGNOSIS — N18.2 DIABETES MELLITUS WITH STAGE 2 CHRONIC KIDNEY DISEASE (HCC): ICD-10-CM

## 2024-03-20 DIAGNOSIS — R20.2 PARESTHESIAS: ICD-10-CM

## 2024-03-20 DIAGNOSIS — E11.65 UNCONTROLLED TYPE 2 DIABETES MELLITUS WITH HYPERGLYCEMIA (HCC): Primary | ICD-10-CM

## 2024-03-20 DIAGNOSIS — E11.22 DIABETES MELLITUS WITH STAGE 2 CHRONIC KIDNEY DISEASE (HCC): ICD-10-CM

## 2024-03-20 LAB
ANION GAP SERPL CALC-SCNC: 2 MMOL/L (ref 2–11)
BUN SERPL-MCNC: 23 MG/DL (ref 6–23)
CALCIUM SERPL-MCNC: 9.5 MG/DL (ref 8.3–10.4)
CHLORIDE SERPL-SCNC: 108 MMOL/L (ref 103–113)
CO2 SERPL-SCNC: 31 MMOL/L (ref 21–32)
CREAT SERPL-MCNC: 1.3 MG/DL (ref 0.8–1.5)
ERYTHROCYTE [DISTWIDTH] IN BLOOD BY AUTOMATED COUNT: 13.3 % (ref 11.9–14.6)
GLUCOSE SERPL-MCNC: 256 MG/DL (ref 65–100)
HCT VFR BLD AUTO: 42.3 % (ref 41.1–50.3)
HGB BLD-MCNC: 13.6 G/DL (ref 13.6–17.2)
MCH RBC QN AUTO: 29.9 PG (ref 26.1–32.9)
MCHC RBC AUTO-ENTMCNC: 32.2 G/DL (ref 31.4–35)
MCV RBC AUTO: 93 FL (ref 82–102)
NRBC # BLD: 0 K/UL (ref 0–0.2)
PLATELET # BLD AUTO: 348 K/UL (ref 150–450)
PMV BLD AUTO: 10.4 FL (ref 9.4–12.3)
POTASSIUM SERPL-SCNC: 4.5 MMOL/L (ref 3.5–5.1)
RBC # BLD AUTO: 4.55 M/UL (ref 4.23–5.6)
SODIUM SERPL-SCNC: 141 MMOL/L (ref 136–146)
WBC # BLD AUTO: 5.8 K/UL (ref 4.3–11.1)

## 2024-03-20 PROCEDURE — 99214 OFFICE O/P EST MOD 30 MIN: CPT | Performed by: INTERNAL MEDICINE

## 2024-03-20 PROCEDURE — 3080F DIAST BP >= 90 MM HG: CPT | Performed by: INTERNAL MEDICINE

## 2024-03-20 PROCEDURE — 3077F SYST BP >= 140 MM HG: CPT | Performed by: INTERNAL MEDICINE

## 2024-03-20 RX ORDER — GABAPENTIN 800 MG/1
800 TABLET ORAL 3 TIMES DAILY PRN
Qty: 90 TABLET | Refills: 3 | Status: SHIPPED | OUTPATIENT
Start: 2024-03-20 | End: 2024-07-18

## 2024-03-20 RX ORDER — GLIPIZIDE 10 MG/1
10 TABLET ORAL 2 TIMES DAILY
Qty: 180 TABLET | Refills: 0 | Status: SHIPPED | OUTPATIENT
Start: 2024-03-20 | End: 2024-06-18

## 2024-03-20 RX ORDER — LOSARTAN POTASSIUM AND HYDROCHLOROTHIAZIDE 25; 100 MG/1; MG/1
1 TABLET ORAL DAILY
Qty: 90 TABLET | Refills: 1 | Status: SHIPPED | OUTPATIENT
Start: 2024-03-20

## 2024-03-20 RX ORDER — AMLODIPINE BESYLATE 10 MG/1
10 TABLET ORAL DAILY
Qty: 90 TABLET | Refills: 1 | Status: SHIPPED | OUTPATIENT
Start: 2024-03-20

## 2024-03-20 RX ORDER — MELATONIN
1000 DAILY
Qty: 90 TABLET | Refills: 1 | Status: SHIPPED | OUTPATIENT
Start: 2024-03-20

## 2024-03-20 RX ORDER — METFORMIN HYDROCHLORIDE 500 MG/1
TABLET, EXTENDED RELEASE ORAL
Qty: 120 TABLET | Refills: 2 | Status: SHIPPED | OUTPATIENT
Start: 2024-03-20

## 2024-03-20 SDOH — ECONOMIC STABILITY: FOOD INSECURITY: WITHIN THE PAST 12 MONTHS, YOU WORRIED THAT YOUR FOOD WOULD RUN OUT BEFORE YOU GOT MONEY TO BUY MORE.: NEVER TRUE

## 2024-03-20 SDOH — ECONOMIC STABILITY: INCOME INSECURITY: HOW HARD IS IT FOR YOU TO PAY FOR THE VERY BASICS LIKE FOOD, HOUSING, MEDICAL CARE, AND HEATING?: NOT HARD AT ALL

## 2024-03-20 SDOH — ECONOMIC STABILITY: FOOD INSECURITY: WITHIN THE PAST 12 MONTHS, THE FOOD YOU BOUGHT JUST DIDN'T LAST AND YOU DIDN'T HAVE MONEY TO GET MORE.: NEVER TRUE

## 2024-03-20 ASSESSMENT — PATIENT HEALTH QUESTIONNAIRE - PHQ9
SUM OF ALL RESPONSES TO PHQ QUESTIONS 1-9: 0
1. LITTLE INTEREST OR PLEASURE IN DOING THINGS: NOT AT ALL
SUM OF ALL RESPONSES TO PHQ QUESTIONS 1-9: 0
SUM OF ALL RESPONSES TO PHQ9 QUESTIONS 1 & 2: 0
SUM OF ALL RESPONSES TO PHQ QUESTIONS 1-9: 0
SUM OF ALL RESPONSES TO PHQ QUESTIONS 1-9: 0
2. FEELING DOWN, DEPRESSED OR HOPELESS: NOT AT ALL

## 2024-03-20 NOTE — PROGRESS NOTES
apnea workup has not been pursued yet  -Encourage patient to take his medications as prescribed daily     Obesity  -BMI 33.15, weight 231pounds  -Continue diet and exercise     Vitamin D deficiency  -Vitamin D on 9/29/2020 was 18.8  -Vitamin D 1000 units daily     Health maintenance  -Cologuard ordered  -Diabetic eye exam completed on 10/5/2023    -Previous medical records and/or labs/tests available to me reviewed, any records outstanding not available requested  -The risks, benefits, and medical necessity of all medications, tests labs, and any other orders that were ordered at today's visit were discussed with the patient including all elective or patient requested orders.  Decision to order or not order tests are based on this risk/benefit ratio and medical necessity.  -The patient expresses understanding of the plan as I've explained it to him and is in agreement with the current plan.    Follow Up: 3 months    Total Time: 30 minutes (chart reviewing and in-exam time)    Signed: Richard Daigle D.O.  03/20/24  8:28 PM

## 2024-03-21 DIAGNOSIS — E11.65 UNCONTROLLED TYPE 2 DIABETES MELLITUS WITH HYPERGLYCEMIA (HCC): Primary | ICD-10-CM

## 2024-03-21 LAB
EST. AVERAGE GLUCOSE BLD GHB EST-MCNC: 255 MG/DL
HBA1C MFR BLD: 10.5 % (ref 4.8–5.6)

## 2024-03-21 NOTE — RESULT ENCOUNTER NOTE
Patient's hemoglobin A1c went back up to 10.5 from 7.6, was 10.55 months ago and he did a good job getting down to 7.6, I recommend that he continue taking his medications and I am going to increase his Jardiance to 25 mg daily.  He really needs to start eating healthier and getting more exercise and being compliant with his medications and taking them every day.

## 2024-03-21 NOTE — RESULT ENCOUNTER NOTE
Attempt to notify patient of lab results per Dr.Jordan Daigle however patient was unavailable, voicemail to return call was made.

## 2024-03-27 ENCOUNTER — TELEPHONE (OUTPATIENT)
Dept: INTERNAL MEDICINE CLINIC | Facility: CLINIC | Age: 54
End: 2024-03-27

## 2024-03-27 NOTE — TELEPHONE ENCOUNTER
Patient's hemoglobin A1c went back up to 10.5 from 7.6, was 10.55 months ago and he did a good job getting down to 7.6, I recommend that he continue taking his medications and I am going to increase his Jardiance to 25 mg daily. He really needs to start eating healthier and getting more exercise and being compliant with his medications and taking them every day.     Spoke to patient, detailed message above was relayed, with emphasis on being more compliant with his medications and eating healthier.  Patient expressed understanding. Jardiance prescription has already been picked up.

## 2024-06-17 ENCOUNTER — PREP FOR PROCEDURE (OUTPATIENT)
Dept: SURGERY | Age: 54
End: 2024-06-17

## 2024-06-17 DIAGNOSIS — Z12.11 SPECIAL SCREENING FOR MALIGNANT NEOPLASMS, COLON: ICD-10-CM

## 2024-07-03 RX ORDER — SODIUM CHLORIDE 0.9 % (FLUSH) 0.9 %
5-40 SYRINGE (ML) INJECTION EVERY 12 HOURS SCHEDULED
Status: CANCELLED | OUTPATIENT
Start: 2024-07-03

## 2024-07-03 RX ORDER — SODIUM CHLORIDE 0.9 % (FLUSH) 0.9 %
5-40 SYRINGE (ML) INJECTION PRN
Status: CANCELLED | OUTPATIENT
Start: 2024-07-03

## 2024-07-03 RX ORDER — SODIUM CHLORIDE 9 MG/ML
INJECTION, SOLUTION INTRAVENOUS PRN
Status: CANCELLED | OUTPATIENT
Start: 2024-07-03

## 2024-07-17 PROBLEM — Z12.11 SPECIAL SCREENING FOR MALIGNANT NEOPLASMS, COLON: Status: RESOLVED | Noted: 2024-06-17 | Resolved: 2024-07-17

## 2024-07-24 PROBLEM — Z12.11 SPECIAL SCREENING FOR MALIGNANT NEOPLASMS, COLON: Status: ACTIVE | Noted: 2024-06-17

## 2024-08-23 PROBLEM — Z12.11 SPECIAL SCREENING FOR MALIGNANT NEOPLASMS, COLON: Status: RESOLVED | Noted: 2024-06-17 | Resolved: 2024-08-23

## 2024-08-28 ENCOUNTER — TELEPHONE (OUTPATIENT)
Dept: INTERNAL MEDICINE CLINIC | Facility: CLINIC | Age: 54
End: 2024-08-28

## 2024-08-28 NOTE — TELEPHONE ENCOUNTER
Attempt to notify patient of missed appointment with Dr. Daigle was unsuccessful, voicemail to return call was made so the appointment can be rescheduled.    I will also reach out to patient via [employer] if applicable to reschedule missed appointment.    No Show letter will be sent

## 2024-12-18 ENCOUNTER — TELEPHONE (OUTPATIENT)
Dept: INTERNAL MEDICINE CLINIC | Facility: CLINIC | Age: 54
End: 2024-12-18

## 2024-12-18 DIAGNOSIS — E11.65 UNCONTROLLED TYPE 2 DIABETES MELLITUS WITH HYPERGLYCEMIA (HCC): ICD-10-CM

## 2024-12-18 DIAGNOSIS — I10 ESSENTIAL HYPERTENSION: ICD-10-CM

## 2024-12-18 DIAGNOSIS — E55.9 VITAMIN D DEFICIENCY: ICD-10-CM

## 2024-12-18 DIAGNOSIS — R20.2 PARESTHESIAS: ICD-10-CM

## 2024-12-18 PROBLEM — Z91.199 NONCOMPLIANCE: Status: ACTIVE | Noted: 2024-12-18

## 2024-12-18 RX ORDER — GABAPENTIN 800 MG/1
800 TABLET ORAL 3 TIMES DAILY PRN
Qty: 204 TABLET | Refills: 0 | Status: SHIPPED | OUTPATIENT
Start: 2024-12-18 | End: 2025-02-24

## 2024-12-18 RX ORDER — GLIPIZIDE 10 MG/1
10 TABLET ORAL 2 TIMES DAILY
Qty: 136 TABLET | Refills: 0 | Status: SHIPPED | OUTPATIENT
Start: 2024-12-18 | End: 2025-02-24

## 2024-12-18 RX ORDER — LOSARTAN POTASSIUM AND HYDROCHLOROTHIAZIDE 25; 100 MG/1; MG/1
1 TABLET ORAL DAILY
Qty: 68 TABLET | Refills: 0 | Status: SHIPPED | OUTPATIENT
Start: 2024-12-18 | End: 2025-02-24

## 2024-12-18 RX ORDER — ROSUVASTATIN CALCIUM 10 MG/1
10 TABLET, COATED ORAL NIGHTLY
Qty: 68 TABLET | Refills: 0 | Status: SHIPPED | OUTPATIENT
Start: 2024-12-18 | End: 2025-02-24

## 2024-12-18 RX ORDER — METFORMIN HYDROCHLORIDE 500 MG/1
TABLET, EXTENDED RELEASE ORAL
Qty: 60 TABLET | Refills: 2 | Status: SHIPPED | OUTPATIENT
Start: 2024-12-18 | End: 2025-02-24

## 2024-12-18 RX ORDER — AMLODIPINE BESYLATE 10 MG/1
10 TABLET ORAL DAILY
Qty: 68 TABLET | Refills: 0 | Status: SHIPPED | OUTPATIENT
Start: 2024-12-18 | End: 2025-02-24

## 2024-12-18 RX ORDER — CHOLECALCIFEROL (VITAMIN D3) 25 MCG
1000 TABLET ORAL DAILY
Qty: 68 TABLET | Refills: 0 | Status: SHIPPED | OUTPATIENT
Start: 2024-12-18 | End: 2025-02-24

## 2024-12-18 NOTE — TELEPHONE ENCOUNTER
Pended medications to last 68 days until patient appointment on 02/24/2025. Will let patient know if he does not come to the February appointment, refills will not be sent until he is seen

## 2024-12-18 NOTE — TELEPHONE ENCOUNTER
Patient called to request medication refills, last seen 3-20-24, cancelled 6-20-24 No Show 8-28-24 and cancelled appointment scheduled for this afternoon- now scheduled for 2-24-25. Dispense report shows that medications have not been prescribed for the patient since this past March.  Please call the patient is you want to refill the medications

## 2025-02-24 ENCOUNTER — OFFICE VISIT (OUTPATIENT)
Dept: INTERNAL MEDICINE CLINIC | Facility: CLINIC | Age: 55
End: 2025-02-24
Payer: COMMERCIAL

## 2025-02-24 VITALS
BODY MASS INDEX: 36.45 KG/M2 | OXYGEN SATURATION: 98 % | SYSTOLIC BLOOD PRESSURE: 180 MMHG | WEIGHT: 254 LBS | DIASTOLIC BLOOD PRESSURE: 100 MMHG | HEART RATE: 92 BPM

## 2025-02-24 DIAGNOSIS — E66.811 CLASS 1 OBESITY DUE TO EXCESS CALORIES WITH SERIOUS COMORBIDITY AND BODY MASS INDEX (BMI) OF 34.0 TO 34.9 IN ADULT: ICD-10-CM

## 2025-02-24 DIAGNOSIS — G62.9 NEUROPATHY: Primary | ICD-10-CM

## 2025-02-24 DIAGNOSIS — N18.2 DIABETES MELLITUS WITH STAGE 2 CHRONIC KIDNEY DISEASE (HCC): ICD-10-CM

## 2025-02-24 DIAGNOSIS — Z91.199 NONCOMPLIANCE: ICD-10-CM

## 2025-02-24 DIAGNOSIS — E11.65 UNCONTROLLED TYPE 2 DIABETES MELLITUS WITH HYPERGLYCEMIA (HCC): ICD-10-CM

## 2025-02-24 DIAGNOSIS — E66.09 CLASS 1 OBESITY DUE TO EXCESS CALORIES WITH SERIOUS COMORBIDITY AND BODY MASS INDEX (BMI) OF 34.0 TO 34.9 IN ADULT: ICD-10-CM

## 2025-02-24 DIAGNOSIS — R20.2 PARESTHESIAS: ICD-10-CM

## 2025-02-24 DIAGNOSIS — E11.22 DIABETES MELLITUS WITH STAGE 2 CHRONIC KIDNEY DISEASE (HCC): ICD-10-CM

## 2025-02-24 DIAGNOSIS — E55.9 VITAMIN D DEFICIENCY: ICD-10-CM

## 2025-02-24 DIAGNOSIS — I10 ESSENTIAL HYPERTENSION: ICD-10-CM

## 2025-02-24 LAB
ANION GAP SERPL CALC-SCNC: 10 MMOL/L (ref 7–16)
BASOPHILS # BLD: 0.04 K/UL (ref 0–0.2)
BASOPHILS NFR BLD: 0.7 % (ref 0–2)
BUN SERPL-MCNC: 28 MG/DL (ref 6–23)
CALCIUM SERPL-MCNC: 9.2 MG/DL (ref 8.8–10.2)
CHLORIDE SERPL-SCNC: 105 MMOL/L (ref 98–107)
CO2 SERPL-SCNC: 25 MMOL/L (ref 20–29)
CREAT SERPL-MCNC: 1.47 MG/DL (ref 0.8–1.3)
DIFFERENTIAL METHOD BLD: ABNORMAL
EOSINOPHIL # BLD: 0.07 K/UL (ref 0–0.8)
EOSINOPHIL NFR BLD: 1.1 % (ref 0.5–7.8)
ERYTHROCYTE [DISTWIDTH] IN BLOOD BY AUTOMATED COUNT: 12.6 % (ref 11.9–14.6)
EST. AVERAGE GLUCOSE BLD GHB EST-MCNC: 312 MG/DL
GLUCOSE SERPL-MCNC: 252 MG/DL (ref 70–99)
HBA1C MFR BLD: 12.5 % (ref 0–5.6)
HCT VFR BLD AUTO: 41.1 % (ref 41.1–50.3)
HGB BLD-MCNC: 13.3 G/DL (ref 13.6–17.2)
IMM GRANULOCYTES # BLD AUTO: 0.02 K/UL (ref 0–0.5)
IMM GRANULOCYTES NFR BLD AUTO: 0.3 % (ref 0–5)
LYMPHOCYTES # BLD: 2.07 K/UL (ref 0.5–4.6)
LYMPHOCYTES NFR BLD: 33.8 % (ref 13–44)
MCH RBC QN AUTO: 29.8 PG (ref 26.1–32.9)
MCHC RBC AUTO-ENTMCNC: 32.4 G/DL (ref 31.4–35)
MCV RBC AUTO: 91.9 FL (ref 82–102)
MONOCYTES # BLD: 0.48 K/UL (ref 0.1–1.3)
MONOCYTES NFR BLD: 7.8 % (ref 4–12)
NEUTS SEG # BLD: 3.45 K/UL (ref 1.7–8.2)
NEUTS SEG NFR BLD: 56.3 % (ref 43–78)
NRBC # BLD: 0 K/UL (ref 0–0.2)
PLATELET # BLD AUTO: 328 K/UL (ref 150–450)
PMV BLD AUTO: 11.4 FL (ref 9.4–12.3)
POTASSIUM SERPL-SCNC: 4.1 MMOL/L (ref 3.5–5.1)
RBC # BLD AUTO: 4.47 M/UL (ref 4.23–5.6)
SODIUM SERPL-SCNC: 140 MMOL/L (ref 136–145)
WBC # BLD AUTO: 6.1 K/UL (ref 4.3–11.1)

## 2025-02-24 PROCEDURE — 3080F DIAST BP >= 90 MM HG: CPT | Performed by: INTERNAL MEDICINE

## 2025-02-24 PROCEDURE — G2211 COMPLEX E/M VISIT ADD ON: HCPCS | Performed by: INTERNAL MEDICINE

## 2025-02-24 PROCEDURE — 99214 OFFICE O/P EST MOD 30 MIN: CPT | Performed by: INTERNAL MEDICINE

## 2025-02-24 PROCEDURE — 3077F SYST BP >= 140 MM HG: CPT | Performed by: INTERNAL MEDICINE

## 2025-02-24 RX ORDER — AMLODIPINE BESYLATE 10 MG/1
10 TABLET ORAL DAILY
Qty: 90 TABLET | Refills: 1 | Status: SHIPPED | OUTPATIENT
Start: 2025-02-24 | End: 2025-02-24

## 2025-02-24 RX ORDER — METFORMIN HYDROCHLORIDE 500 MG/1
TABLET, EXTENDED RELEASE ORAL
Qty: 60 TABLET | Refills: 2 | Status: SHIPPED | OUTPATIENT
Start: 2025-02-24 | End: 2025-05-03

## 2025-02-24 RX ORDER — OLMESARTAN MEDOXOMIL AND HYDROCHLOROTHIAZIDE 40/25 40; 25 MG/1; MG/1
1 TABLET ORAL DAILY
Qty: 90 TABLET | Refills: 1 | Status: SHIPPED | OUTPATIENT
Start: 2025-02-24 | End: 2025-02-24

## 2025-02-24 RX ORDER — DAPAGLIFLOZIN 10 MG/1
10 TABLET, FILM COATED ORAL EVERY MORNING
Qty: 90 TABLET | Refills: 1 | Status: SHIPPED | OUTPATIENT
Start: 2025-02-24 | End: 2025-08-23

## 2025-02-24 RX ORDER — AMLODIPINE BESYLATE 10 MG/1
10 TABLET ORAL DAILY
Qty: 90 TABLET | Refills: 1 | Status: SHIPPED | OUTPATIENT
Start: 2025-02-24 | End: 2025-08-23

## 2025-02-24 RX ORDER — ROSUVASTATIN CALCIUM 20 MG/1
20 TABLET, COATED ORAL DAILY
Qty: 90 TABLET | Refills: 1 | Status: SHIPPED | OUTPATIENT
Start: 2025-02-24

## 2025-02-24 RX ORDER — GABAPENTIN 800 MG/1
800 TABLET ORAL 3 TIMES DAILY PRN
Qty: 204 TABLET | Refills: 0 | Status: SHIPPED | OUTPATIENT
Start: 2025-02-24 | End: 2025-05-03

## 2025-02-24 RX ORDER — GLIPIZIDE 10 MG/1
10 TABLET ORAL 2 TIMES DAILY
Qty: 136 TABLET | Refills: 0 | Status: SHIPPED | OUTPATIENT
Start: 2025-02-24 | End: 2025-05-03

## 2025-02-24 RX ORDER — OLMESARTAN MEDOXOMIL AND HYDROCHLOROTHIAZIDE 40/25 40; 25 MG/1; MG/1
1 TABLET ORAL DAILY
Qty: 90 TABLET | Refills: 1 | Status: SHIPPED | OUTPATIENT
Start: 2025-02-24

## 2025-02-24 RX ORDER — AMLODIPINE BESYLATE 10 MG/1
10 TABLET ORAL DAILY
Qty: 68 TABLET | Refills: 0 | Status: SHIPPED | OUTPATIENT
Start: 2025-02-24 | End: 2025-02-24

## 2025-02-24 SDOH — ECONOMIC STABILITY: FOOD INSECURITY: WITHIN THE PAST 12 MONTHS, YOU WORRIED THAT YOUR FOOD WOULD RUN OUT BEFORE YOU GOT MONEY TO BUY MORE.: NEVER TRUE

## 2025-02-24 SDOH — ECONOMIC STABILITY: FOOD INSECURITY: WITHIN THE PAST 12 MONTHS, THE FOOD YOU BOUGHT JUST DIDN'T LAST AND YOU DIDN'T HAVE MONEY TO GET MORE.: NEVER TRUE

## 2025-02-24 ASSESSMENT — PATIENT HEALTH QUESTIONNAIRE - PHQ9
SUM OF ALL RESPONSES TO PHQ QUESTIONS 1-9: 0
SUM OF ALL RESPONSES TO PHQ QUESTIONS 1-9: 0
SUM OF ALL RESPONSES TO PHQ9 QUESTIONS 1 & 2: 0
SUM OF ALL RESPONSES TO PHQ QUESTIONS 1-9: 0
1. LITTLE INTEREST OR PLEASURE IN DOING THINGS: NOT AT ALL
SUM OF ALL RESPONSES TO PHQ QUESTIONS 1-9: 0
2. FEELING DOWN, DEPRESSED OR HOPELESS: NOT AT ALL

## 2025-02-24 NOTE — PROGRESS NOTES
Richard Daigle D.O.   [place] [address]  Tel: [phone]    Office Visit: Follow Up     Patient Name: Jonnie Rogel   :  1970   MRN:   [id number]      Today's Date: 25 8:45 AM    Subjective     The patient is a 55 y.o.-year-old male who presents for follow-up.    Type 2 diabetes mellitus  -Hemoglobin A1c from 3/20/2024 was 10.5  -Glipizide 10 mg twice daily  -Metformin 1000 mg twice daily  -Jardiance 25 mg daily; he states he is not taking this due to the pharmacy not giving it to him.   -Crestor 10 mg daily  -He reports he is \"eating much healthier than he's ever eaten in his life.\" He is exercising more and is now on day shift.     Primary hypertension  -Losartan-hydrochlorothiazide 100-25 mg daily  -Amlodipine 10 mg daily  -Patient previously referred to cardiology for evaluation of biventricular hypertrophy but did not follow-up.  -Sleep apnea workup has not been pursued yet  -Patient states he is taking his medication but is not sure if he is taking his blood pressure medications.   -He reports he is \"eating much healthier than he's ever eaten in his life.\" He is exercising more and is now on day shift.      Diabetic Neuropathy, well controlled   -Neurontin 800 mg TID     Microalbuminuria  -Microalbumin on 2022 was 18.4 with a microalbumin creatinine ratio of 267  -Losartan-hydrochlorothiazide 100-25 mg daily  -Jardiance 25 mg daily; he states he is not taking this due to the pharmacy not giving it to him.   -Patient previously following with nephrology, last seen on 3/8/2024, renal function stable at this time, he was supposed to follow-up with nephrology around 2024, however there are no nephrology follow-ups on file.     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

## 2025-02-25 ENCOUNTER — FOLLOWUP TELEPHONE ENCOUNTER (OUTPATIENT)
Dept: DIABETES SERVICES | Age: 55
End: 2025-02-25

## 2025-02-25 DIAGNOSIS — E11.65 UNCONTROLLED TYPE 2 DIABETES MELLITUS WITH HYPERGLYCEMIA (HCC): ICD-10-CM

## 2025-02-25 DIAGNOSIS — E11.65 UNCONTROLLED TYPE 2 DIABETES MELLITUS WITH HYPERGLYCEMIA (HCC): Primary | ICD-10-CM

## 2025-02-25 RX ORDER — INSULIN DEGLUDEC 200 U/ML
INJECTION, SOLUTION SUBCUTANEOUS
Refills: 0 | OUTPATIENT
Start: 2025-02-25

## 2025-02-25 RX ORDER — HYDROCHLOROTHIAZIDE 12.5 MG/1
CAPSULE ORAL
Qty: 1 EACH | Refills: 12 | Status: SHIPPED | OUTPATIENT
Start: 2025-02-25

## 2025-02-25 RX ORDER — KETOROLAC TROMETHAMINE 30 MG/ML
INJECTION, SOLUTION INTRAMUSCULAR; INTRAVENOUS
Qty: 1 EACH | Refills: 0 | Status: SHIPPED | OUTPATIENT
Start: 2025-02-25

## 2025-02-25 RX ORDER — CHOLECALCIFEROL (VITAMIN D3) 25 MCG
1 TABLET ORAL DAILY
Qty: 90 TABLET | Refills: 1 | Status: SHIPPED | OUTPATIENT
Start: 2025-02-25

## 2025-02-25 NOTE — TELEPHONE ENCOUNTER
Called patient to tell them about our free diabetes education program. Unable to speak to patient. Left message for patient to call back at [phone] or [phone]. DM 2.

## 2025-02-25 NOTE — RESULT ENCOUNTER NOTE
Patient's hemoglobin A1c significantly higher now it is now 12.5 up from 10.5.  He needs to start taking his medications as prescribed I am going to call in some insulin for him to start taking every day as well.  He needs to also work on eating healthy and getting more exercise.  I am also calling in a glucose monitor for him to wear while he is taking insulin.  I am also referring him to the diabetic clinic for diabetes education.

## 2025-03-04 ENCOUNTER — FOLLOWUP TELEPHONE ENCOUNTER (OUTPATIENT)
Dept: DIABETES SERVICES | Age: 55
End: 2025-03-04

## 2025-03-04 NOTE — TELEPHONE ENCOUNTER
Called patient to tell them about our free diabetes education program. Unable to speak to patient. Left message for patient to call back at [phone] or [phone]. DM2.

## 2025-07-17 ENCOUNTER — PATIENT MESSAGE (OUTPATIENT)
Dept: INTERNAL MEDICINE CLINIC | Facility: CLINIC | Age: 55
End: 2025-07-17

## 2025-07-17 NOTE — TELEPHONE ENCOUNTER
Called pt and lvm to return call to reschedule missed appt. Also sent letter and message link to email.